# Patient Record
Sex: FEMALE | Race: BLACK OR AFRICAN AMERICAN | NOT HISPANIC OR LATINO
[De-identification: names, ages, dates, MRNs, and addresses within clinical notes are randomized per-mention and may not be internally consistent; named-entity substitution may affect disease eponyms.]

---

## 2020-03-16 PROBLEM — Z00.00 ENCOUNTER FOR PREVENTIVE HEALTH EXAMINATION: Status: ACTIVE | Noted: 2020-03-16

## 2020-05-07 ENCOUNTER — APPOINTMENT (OUTPATIENT)
Dept: COLORECTAL SURGERY | Facility: CLINIC | Age: 35
End: 2020-05-07

## 2020-07-02 ENCOUNTER — APPOINTMENT (OUTPATIENT)
Dept: COLORECTAL SURGERY | Facility: CLINIC | Age: 35
End: 2020-07-02
Payer: COMMERCIAL

## 2020-07-02 VITALS
DIASTOLIC BLOOD PRESSURE: 73 MMHG | HEART RATE: 81 BPM | HEIGHT: 63 IN | TEMPERATURE: 99.1 F | WEIGHT: 117 LBS | BODY MASS INDEX: 20.73 KG/M2 | SYSTOLIC BLOOD PRESSURE: 120 MMHG

## 2020-07-02 DIAGNOSIS — Z80.42 FAMILY HISTORY OF MALIGNANT NEOPLASM OF PROSTATE: ICD-10-CM

## 2020-07-02 DIAGNOSIS — Z72.89 OTHER PROBLEMS RELATED TO LIFESTYLE: ICD-10-CM

## 2020-07-02 DIAGNOSIS — Z83.511 FAMILY HISTORY OF GLAUCOMA: ICD-10-CM

## 2020-07-02 DIAGNOSIS — Z87.19 PERSONAL HISTORY OF OTHER DISEASES OF THE DIGESTIVE SYSTEM: ICD-10-CM

## 2020-07-02 DIAGNOSIS — Z86.010 PERSONAL HISTORY OF COLONIC POLYPS: ICD-10-CM

## 2020-07-02 PROCEDURE — 99203 OFFICE O/P NEW LOW 30 MIN: CPT | Mod: 25

## 2020-07-02 PROCEDURE — 46600 DIAGNOSTIC ANOSCOPY SPX: CPT

## 2020-07-02 NOTE — ASSESSMENT
[FreeTextEntry1] : Exam findings were discussed at length with patient.\par No acute pathology noted on anoscopic examination. Likely sequela of healed anal fissure. \par Patient currently asymptomatic\par If symptoms return, advised patient follow up for reevaluation\par Discussed if symptoms return or progress, MRI pelvis can be considered for further evaluation\par All questions were answered, patient expressed understanding, and is agreeable to this plan.\par

## 2020-07-02 NOTE — HISTORY OF PRESENT ILLNESS
[FreeTextEntry1] : 34 yo F presents for evaluation of possible fistula, referred by GI Dr. Sunni Pepe\par \par c/o second hole near anus, noticed 6-7 years ago after she experienced rectal pain during BMs\par Also associated with BRBPR noted on TP. Saw a GI at the time and reports exam was otherwise normal\par Pain and BPR resolved after 2-3 months, however pain and blood on TP returned after bouts of constipation which was improved w/ increased water intake.\par Denies fever, swelling, lumps, bumps or history of discharge\par Denies rectal symptoms at present\par \par BH: 1-2 times daily\par Reports could improve dietary intake, Drinks adequate water\par Denies use of stool softener or laxative\par \par Personal history of polyps w/ one precancerous polyp (12/10/2019). \par Last colonoscopy 1/28/2020: colonic mucosa unremarkable. No mass or polyp seen. \par Biopsies obtained: colonic mucosa w/ focal acute hemorrhage and no histopathologic changes.\par Internal hemorrhoids noted.\par Denies FMH colorectal CA or IBD

## 2020-07-02 NOTE — PHYSICAL EXAM
[FreeTextEntry1] : Medical assistant present for duration of physical examination\par \par Gen NAD, AOx3\par Nonlabored breathing\par Ambulating without assistance\par Skin normal color and pigment, no visible lesions or rashes\par \par Anorectal Exam:\par Inspection no erythema, induration or fluctuance, no skin excoriation, no fissure, posterior midline visible depression overlying intersphincteric groove without overlying associated skin changes or skin opening\par NARENDRA nontender, no masses palpated, no blood on gloved finger\par \par Procedure: Anoscopy\par \par Pre procedure Diagnosis: rule out anal fissure, rule out anal fistula\par Post procedure Diagnosis: normal anoscopic examination\par Anesthesia: none\par Estimated blood loss: none\par Specimen: none\par Complications: none\par \par Consent obtained. Anoscopy was performed by passing a lighted anoscope with lubricant jelly into the anal canal and the entire anal mucosal surface was inspected. Findings included no fissure, nonfriable internal hemorrhoidal tissue, no visible masses or lesions, no visible internal opening\par \par Patient tolerated examination and procedure well.\par \par \par

## 2021-05-25 ENCOUNTER — APPOINTMENT (OUTPATIENT)
Dept: COLORECTAL SURGERY | Facility: CLINIC | Age: 36
End: 2021-05-25
Payer: COMMERCIAL

## 2021-05-25 VITALS
WEIGHT: 116 LBS | HEIGHT: 63 IN | TEMPERATURE: 98.9 F | HEART RATE: 101 BPM | SYSTOLIC BLOOD PRESSURE: 130 MMHG | BODY MASS INDEX: 20.55 KG/M2 | DIASTOLIC BLOOD PRESSURE: 52 MMHG

## 2021-05-25 PROCEDURE — 46600 DIAGNOSTIC ANOSCOPY SPX: CPT

## 2021-05-25 NOTE — HISTORY OF PRESENT ILLNESS
[FreeTextEntry1] : 35 yo F presents for rectal pain and bleeding\par PMH anemia, on daily iron supplement\par Previously seen 7/2020 to evaluate for possible fissure and fistula. Exam notable for posterior midline visible depression overlying intersphincteric groove without overlying associated skin changes or skin opening, likely sequela of healed anal fissure\par \par Pt reports onset of symptoms approx 1.5 weeks ago in setting of constipation w/ straining and hard stools. (+) sharp pain during BM followed by soreness, (+) BRBPR noted on TP however today she noticed in toilet bowl. \par She has been drinking more water w/ improvement in stool consistency.  Also tried applying jojoba every other day\par Denies itching, burning or lumps/bumps\par \par BH: once daily\par Reports typically adequate dietary fiber, drinking 1 L water daily\par Personal history of polyps w/ one precancerous polyp (12/10/2019)\par Last colonoscopy 1/28/2020: colonic mucosa unremarkable. No mass or polyp seen. \par Biopsies obtained: colonic mucosa w/ focal acute hemorrhage and no histopathologic changes.\par Internal hemorrhoids noted.\par Denies FMH colorectal CA or IBD \par Denies ASA/NSAID use in last 7 days

## 2021-05-25 NOTE — PHYSICAL EXAM
[FreeTextEntry1] : Medical assistant present for duration of physical examination\par \par General no acute distress, alert and oriented\par Psych calm, pleasant demeanor, responding appropriately to questions\par Nonlabored breathing\par Ambulating without assistance\par Skin normal color and pigment, no visible lesions or rashes\par \par Anorectal Exam:\par Inspection no erythema, induration or fluctuance, no skin excoriation, posterior midline fissure, small right anterior external hemorrhoid noninflamed nonthrombosed\par NARENDRA nontender, no masses palpated, no blood on gloved finger, elevated tone at rest\par \par Procedure: Anoscopy\par \par Pre procedure Diagnosis: anal fissure\par Post procedure Diagnosis: anal fissure, internal hemorrhoids\par Anesthesia: none\par Estimated blood loss: none\par Specimen: none\par Complications: none\par \par Consent obtained. Anoscopy was performed by passing a lighted anoscope with lubricant jelly into the anal canal and the entire anal mucosal surface was inspected. Findings included posterior midline anal fissure, mild internal hemorrhoidal inflammation \par \par Patient tolerated examination and procedure well.\par \par \par

## 2021-05-25 NOTE — ASSESSMENT
[FreeTextEntry1] : Exam findings and diagnosis were discussed at length with patient. \par Recommendations including increased fiber intake, adequate daily hydration, stool softeners as needed, and sitz baths as needed and after bowel movements were discussed.\par Medical management, such diltiazem cream TID, was discussed.\par Patient understands that surgical options do exist for chronic fissures refractory to medical management, however given the acute nature of the fissure, we discussed a trial of medical management first.\par Patient will follow in 6 weeks or sooner as needed if symptoms persist or progress.\par Sexual counseling provided regarding anally receptive intercourse.\par All questions answered, patient expressed understanding and is agreeable to this plan.\par

## 2021-07-08 ENCOUNTER — APPOINTMENT (OUTPATIENT)
Dept: COLORECTAL SURGERY | Facility: CLINIC | Age: 36
End: 2021-07-08
Payer: COMMERCIAL

## 2021-07-08 VITALS
HEART RATE: 88 BPM | HEIGHT: 63 IN | TEMPERATURE: 98.7 F | DIASTOLIC BLOOD PRESSURE: 72 MMHG | WEIGHT: 114 LBS | SYSTOLIC BLOOD PRESSURE: 109 MMHG | BODY MASS INDEX: 20.2 KG/M2

## 2021-07-08 DIAGNOSIS — K60.2 ANAL FISSURE, UNSPECIFIED: ICD-10-CM

## 2021-07-08 PROCEDURE — 46600 DIAGNOSTIC ANOSCOPY SPX: CPT

## 2021-07-08 NOTE — HISTORY OF PRESENT ILLNESS
[FreeTextEntry1] : 37 y/o F presents for f/u anal fissure\par H/o anemia, on iron supplementation daily \par \par Last seen in the office on 5/25/21. Posterior midline anal fissure and mild internal hemorrhoids noted on exam. Patient prescribed Diltiazem TID\par \par Using cream daily (found it difficult to apply and therefore did not use TID), with almost immediate resolution of symptoms. \par Denies pain or BRBPR\par BH: daily, no diarrhea or constipation\par \par Last colonoscopy completed 1/28/20, hemorrhoids noted

## 2021-07-08 NOTE — ASSESSMENT
[FreeTextEntry1] : Exam findings and diagnosis were discussed at length with patient.\par Symptoms resolved and clinical exam notes resolution of anal fissure on anoscopy. No hemorrhoidal inflammation seen.\par Patient reassured.\par Continue bowel regimen, avoid constipation, diarrhea, pushing or straining.\par If symptoms return, can resume diltiazem compound and recommend f/u evaluation at that time\par All questions were answered, patient expressed understanding, and is agreeable to this plan.

## 2021-07-08 NOTE — PHYSICAL EXAM
[FreeTextEntry1] : Medical assistant present for duration of physical examination\par \par General no acute distress, alert and oriented\par Psych calm, pleasant demeanor, responding appropriately to questions\par Nonlabored breathing\par Ambulating without assistance\par Skin normal color and pigment, no visible lesions or rashes\par \par Anorectal Exam:\par Inspection no erythema, induration or fluctuance, no skin excoriation, posterior midline fissure resolved with epithelialization of skin overlying posterior intersphincteric groove, small right anterior external hemorrhoid noninflamed nonthrombosed\par NARENDRA nontender, no masses palpated, no blood on gloved finger, normal tone\par \par Procedure: Anoscopy\par \par Pre procedure Diagnosis: h/o anal fissure\par Post procedure Diagnosis: h/o anal fissure\par Anesthesia: none\par Estimated blood loss: none\par Specimen: none\par Complications: none\par \par Consent obtained. Anoscopy was performed by passing a lighted anoscope with lubricant jelly into the anal canal and the entire anal mucosal surface was inspected. Findings included posterior midline anal fissure has resolved, no internal hemorrhoidal inflammation \par \par Patient tolerated examination and procedure well.\par \par \par

## 2023-06-22 ENCOUNTER — NON-APPOINTMENT (OUTPATIENT)
Age: 38
End: 2023-06-22

## 2023-06-22 ENCOUNTER — APPOINTMENT (OUTPATIENT)
Dept: COLORECTAL SURGERY | Facility: CLINIC | Age: 38
End: 2023-06-22
Payer: COMMERCIAL

## 2023-06-22 VITALS
DIASTOLIC BLOOD PRESSURE: 87 MMHG | HEART RATE: 72 BPM | TEMPERATURE: 98 F | SYSTOLIC BLOOD PRESSURE: 120 MMHG | HEIGHT: 63 IN | WEIGHT: 116 LBS | BODY MASS INDEX: 20.55 KG/M2

## 2023-06-22 DIAGNOSIS — K62.89 OTHER SPECIFIED DISEASES OF ANUS AND RECTUM: ICD-10-CM

## 2023-06-22 PROCEDURE — 46600 DIAGNOSTIC ANOSCOPY SPX: CPT

## 2023-06-22 NOTE — PHYSICAL EXAM
[FreeTextEntry1] : Medical assistant present for duration of physical examination\par \par General no acute distress, alert and oriented\par Psych responding appropriately to questions\par Nonlabored breathing\par Ambulating without assistance\par Skin normal color and pigment, no visible lesions or rashes\par \par Anorectal Exam:\par Inspection no erythema, no fissure, no external hemorrhoids\par NARENDRA nontender, no masses palpated, no blood on gloved finger, no scarring or stenosis\par \par Procedure: Anoscopy\par \par Pre procedure Diagnosis: anal discomfort\par Post procedure Diagnosis: anal discomfort\par Anesthesia: none\par Estimated blood loss: none\par Specimen: none\par Complications: none\par \par Consent obtained. Anoscopy was performed by passing a lighted anoscope with lubricant jelly into the anal canal and the entire anal mucosal surface was inspected. Findings included no anal fissure, mild internal hemorrhoids\par \par Patient tolerated examination and procedure well.\par \par \par

## 2023-06-22 NOTE — ASSESSMENT
[FreeTextEntry1] : Reports episodes of discomfort lasting a few days after hard stool. Usually occurring if not drinking enough water. \par Importance of high fiber intake and adequate water intake discussed. \par Preparation H and Calmol-4 suppositories as needed for symptoms.\par F/u as needed.\par All questions were answered, patient expressed understanding, and is agreeable to this plan.\par

## 2023-06-22 NOTE — HISTORY OF PRESENT ILLNESS
[FreeTextEntry1] : 37 yo F presents for follow up\par \par GI Dr Sunni Pepe\par Personal history of polyps w/ one precancerous polyp (12/10/2019). \par Last colonoscopy 1/28/2020: colonic mucosa unremarkable. No mass or polyp seen. \par Biopsies obtained: colonic mucosa w/ focal acute hemorrhage and no histopathologic changes.\par \par H/o anemia, on iron supplementation daily\par \par Seen for initial evaluation 7/2020 with patient concern for possible anal fistula. No acute pathology noted on exam at that time. \par \par Seen in follow up 5/2021 c/o rectal pain and bleeding. \par Exam noted posterior midline anal fissure and mild internal hemorrhoids. Patient prescribed Diltiazem TID. \par \par Seen most recently 7/8/21 with resolution of symptoms.\par \par Returns today c/o some pain in anal area.\par States she is eating better and drinking more water, moving bowels every day, feels a little throbbing after BM. Symptoms usually occur if she doesn't drink enough water. \par Would have flare ups every 3-4 months, go away quickly, and would use leftover diltiazem once as needed for 1-2 days. Last used diltiazem about 1 month. \par Denies BRBPR in past year.\par \par No changes in health, no new medications.